# Patient Record
Sex: FEMALE | URBAN - METROPOLITAN AREA
[De-identification: names, ages, dates, MRNs, and addresses within clinical notes are randomized per-mention and may not be internally consistent; named-entity substitution may affect disease eponyms.]

---

## 2024-11-22 ENCOUNTER — OFFICE VISIT (OUTPATIENT)
Dept: NEUROLOGY | Facility: MEDICAL CENTER | Age: 74
End: 2024-11-22
Attending: STUDENT IN AN ORGANIZED HEALTH CARE EDUCATION/TRAINING PROGRAM
Payer: MEDICARE

## 2024-11-22 VITALS
DIASTOLIC BLOOD PRESSURE: 60 MMHG | HEART RATE: 83 BPM | HEIGHT: 66 IN | BODY MASS INDEX: 30.47 KG/M2 | RESPIRATION RATE: 16 BRPM | OXYGEN SATURATION: 99 % | SYSTOLIC BLOOD PRESSURE: 116 MMHG | WEIGHT: 189.6 LBS | TEMPERATURE: 97.3 F

## 2024-11-22 DIAGNOSIS — R29.898 WEAKNESS OF BOTH LOWER EXTREMITIES: ICD-10-CM

## 2024-11-22 DIAGNOSIS — M54.6 RIGHT-SIDED THORACIC BACK PAIN, UNSPECIFIED CHRONICITY: ICD-10-CM

## 2024-11-22 PROCEDURE — 3078F DIAST BP <80 MM HG: CPT | Performed by: STUDENT IN AN ORGANIZED HEALTH CARE EDUCATION/TRAINING PROGRAM

## 2024-11-22 PROCEDURE — 99202 OFFICE O/P NEW SF 15 MIN: CPT | Performed by: STUDENT IN AN ORGANIZED HEALTH CARE EDUCATION/TRAINING PROGRAM

## 2024-11-22 PROCEDURE — 99204 OFFICE O/P NEW MOD 45 MIN: CPT | Performed by: STUDENT IN AN ORGANIZED HEALTH CARE EDUCATION/TRAINING PROGRAM

## 2024-11-22 PROCEDURE — 3074F SYST BP LT 130 MM HG: CPT | Performed by: STUDENT IN AN ORGANIZED HEALTH CARE EDUCATION/TRAINING PROGRAM

## 2024-11-22 RX ORDER — LANSOPRAZOLE 30 MG/1
30 CAPSULE, DELAYED RELEASE ORAL DAILY
COMMUNITY

## 2024-11-22 RX ORDER — ESOMEPRAZOLE MAGNESIUM 20 MG/1
TABLET, DELAYED RELEASE ORAL
COMMUNITY

## 2024-11-22 RX ORDER — LISINOPRIL 10 MG/1
10 TABLET ORAL DAILY
COMMUNITY

## 2024-11-22 RX ORDER — ATORVASTATIN CALCIUM 10 MG/1
20 TABLET, FILM COATED ORAL NIGHTLY
COMMUNITY

## 2024-11-22 RX ORDER — SPIRONOLACTONE 25 MG/1
25 TABLET ORAL DAILY
COMMUNITY

## 2024-11-22 RX ORDER — FUROSEMIDE 40 MG/1
40 TABLET ORAL DAILY
COMMUNITY

## 2024-11-22 RX ORDER — LIDOCAINE 4 G/G
1 PATCH TOPICAL EVERY 24 HOURS
Qty: 30 PATCH | Refills: 0 | Status: SHIPPED | OUTPATIENT
Start: 2024-11-22 | End: 2024-12-22

## 2024-11-22 RX ORDER — TRAMADOL HYDROCHLORIDE 50 MG/1
50 TABLET ORAL EVERY 4 HOURS PRN
COMMUNITY

## 2024-11-22 ASSESSMENT — PATIENT HEALTH QUESTIONNAIRE - PHQ9
SUM OF ALL RESPONSES TO PHQ QUESTIONS 1-9: 5
CLINICAL INTERPRETATION OF PHQ2 SCORE: 2
5. POOR APPETITE OR OVEREATING: 1 - SEVERAL DAYS

## 2024-11-22 NOTE — PROGRESS NOTES
"[facility] NEUROLOGY    Date of Service: 11/22/24    Referred by: Johnnie De Oliveira  [address] [place] [address]      Reason for the referral/Chief Complaint:  Leg weakness     History of present illness (HPI)   Kori Gillis is a 74 y.o. left handed woman with history of hypertension who presents due to back pain and leg weakness.     She fell 4 years ago - presumably loss consciousness. She was told that \"her back was broke\". She went back to normal after PT and chiropractor sessions but she fell again from a lader 2 years ago because her legs gave out. She was started again on PT. She was told that she had a T11, T12 fracture. This year, she fell again because her legs gave up again. She has had back pain since then - located in right flank, she describes the pain as dull, doesn't shoot, sometimes it lasts for few minutes but sometimes it last for couple days. She has also been experiencing episodes of leg numbness - both entire legs get numb from hip down when she is sitting down    She went to [facility] who reportedly recommended surgery but she declined. She was prescribed  Gabapentin but she did not tolerate. She underwent spinal injections which did not make a difference. She feels that her legs are always weak.  She has difficulty climbing stairs, raising from a chair, imbalance. Arms are asymptomatic.  The more she walks the weaker she gets - she can walk up to half a block .     Medical history  Hypertension     Surgical history  Hysterectomy   Colon Cancer s/p surgery     Relevant family history  None     Social history  Retired     Tobacco: No  Alcohol: Drinks 3 beers at night.     Medications  Outpatient Medications Marked as Taking for the 11/22/24 encounter (Office Visit) with Tiffany English M.D.   Medication Sig Dispense Refill    furosemide (LASIX) 40 MG Tab Take 40 mg by mouth every day.      spironolactone (ALDACTONE) 25 MG Tab Take 25 mg by mouth every day.      lisinopril " (PRINIVIL) 10 MG Tab Take 10 mg by mouth every day.      atorvastatin (LIPITOR) 10 MG Tab Take 20 mg by mouth every evening. 40 mg      lansoprazole (PREVACID) 30 MG CAPSULE DELAYED RELEASE Take 30 mg by mouth every day.      multivitamin Tab Take 1 Tablet by mouth every day.      Esomeprazole Magnesium 20 MG Tablet Delayed Response Take  by mouth.      traMADol (ULTRAM) 50 MG Tab Take 50 mg by mouth every four hours as needed. 25mg as needed      lidocaine (ASPERFLEX) 4 % Patch Place 1 Patch on the skin every 24 hours for 30 days. 30 Patch 0       Physical exam    Vitals:    11/22/24 1409   BP: 116/60   Pulse: 83   Resp: 16   Temp: 36.3 °C (97.3 °F)   SpO2: 99%       General: Appears comfortable     Neurologic exam:    Mental Status: Alert & oriented to person, place, time, and situation. Memory and recall are grossly intact. Language testing shows normal naming, repetition, fluency, and comprehension.    Cranial Nerves: Pupils are equal and reactive to light. Eye movements are normal. No nystagmus noted. The funduscopic exam was deferred. Visual fields are full to confrontation. Facial sensation is symmetric to light touch. Facial activation is symmetric and full. Hearing is intact to conversation. Palate elevates symmetrically. Shoulder shrug is 5/5 power bilaterally. Tongue is midline.    Neuromuscular exam:     Tone: Normal  Fasciculations: None  Atrophy: None  Deformity: None  Rising from chair: Stands from the seated position independently without arm support    Formal muscle testing (MRC grade):    Upper extremities                                                                           Right  Left    Deltoid  5 5        Biceps  5 5   Triceps  5 5   Wrist Flexion  5 5   Wrist Extension 5 5   EDC 5 5   FDP 2,3 5 5   FDP 4,5 5 5   EPL 5 5   FPL 5 5   FDI 5 5   APB 5 5   ADM 5 5   Neck Flexion 5 5   Neck Extension 5 5     Lower extremities    Right  Left    Iliopsoas  4+ giveaway 4 +giveaway    Quadriceps 5 5    Hamstrings 4+ giveaway 4+ giveaway   Tibialis Anterior 5 5   Gastrocnemius 5 5   Foot Inversion 5 5   Foot eversion 5 5   EHL 5 5   Hip Abduction 5 5   Hip Adduction 5 5     Reflexes   Right Left   Pectoralis Present Present   Biceps +++ +++   Triceps ++ ++   Brachioradialis +++ +++   Isbell Absent Absent   Patellar ++ ++   Ankle ++ ++   Clonus Absent Absent   Plantar  Downgoing Downgoing     Sensory:  Light touch: Normal and symmetrical in 4 extremities   Vibration: Normal and symmetrical in great toes and thumbs   Joint Position: Normal in great toes  Pinprick: Normal and symmetrical in 4 extremities   Temperature: Normal and symmetrical in 4 extremities     Coordination: Finger-to-nose movements are smooth, without ataxia or dysmetria.    Gait: Gait is unstable with wide base and normal stride length. Tandem gait is challenging Toe-walking and heel-walking only few steps due to balance.     Relevant Imaging results:   Thoracic spine with and without contrast 12/2023: Subactue or older 75% compression of T11. Minimal marrow edema. No bony canal narrowing. No cord compression.     Lumbar spine MRI 01/2024: L1-L2 mild central stenosis, L2-L3 minimal central stenosis, L3-L4: Mild central stenosis, no foraminal stenosis. L4-L5: Minimal central stenosis. L5-S1: No central stenosis.     Assessment and Plan:   73 y/o patient with history of T11 compression after a fall who presents with leg weakness, imbalance, and back pain. Physical exam remarkable for increased reflexes in arms. There is giveaway weakness of both legs (hip flexion). Prior w/up with MRI thoracic and lumbar spine did not reveal significant stenosis to explain her symptoms (however I cannot see images - only per report). I believe it could be her cervical spine causing her weakness and imbalance. She also has pain on her right flank - unclear if this is coming from her spinal cord.     1. Weakness of both lower extremities  - MR-CERVICAL SPINE-W/O;  Future  - Referral to Physical Therapy    2. Right-sided thoracic back pain, unspecified chronicity  - lidocaine (ASPERFLEX) 4 % Patch; Place 1 Patch on the skin every 24 hours for 30 days.  Dispense: 30 Patch; Refill: 0    Other orders  - furosemide (LASIX) 40 MG Tab; Take 40 mg by mouth every day.  - spironolactone (ALDACTONE) 25 MG Tab; Take 25 mg by mouth every day.  - lisinopril (PRINIVIL) 10 MG Tab; Take 10 mg by mouth every day.  - atorvastatin (LIPITOR) 10 MG Tab; Take 20 mg by mouth every evening. 40 mg  - lansoprazole (PREVACID) 30 MG CAPSULE DELAYED RELEASE; Take 30 mg by mouth every day.  - multivitamin Tab; Take 1 Tablet by mouth every day.  - Esomeprazole Magnesium 20 MG Tablet Delayed Response; Take  by mouth.  - traMADol (ULTRAM) 50 MG Tab; Take 50 mg by mouth every four hours as needed. 25mg as needed       Return to the clinic in 3 months       Approximately 45 minutes were dedicated to this encounter for direct patient care, which did not include chart reviewing, placing orders, and completing documentation.         Tiffany Dalton MD  Neurology  Clinical Neurophysiology   [facility]

## 2024-12-03 ENCOUNTER — TELEPHONE (OUTPATIENT)
Dept: ENDOCRINOLOGY | Facility: MEDICAL CENTER | Age: 74
End: 2024-12-03
Payer: MEDICARE

## 2024-12-03 NOTE — TELEPHONE ENCOUNTER
Pt called because they needed a refill for their medication sent to a different pharmacy    I found out pt had called the wrong depart ment and let them know I was going to forward them to their correct one so they can get their Rx refilled

## 2024-12-04 ENCOUNTER — TELEPHONE (OUTPATIENT)
Dept: NEUROLOGY | Facility: MEDICAL CENTER | Age: 74
End: 2024-12-04
Payer: MEDICARE

## 2024-12-04 DIAGNOSIS — M54.6 RIGHT-SIDED THORACIC BACK PAIN, UNSPECIFIED CHRONICITY: ICD-10-CM

## 2024-12-04 RX ORDER — LIDOCAINE 4 G/G
1 PATCH TOPICAL EVERY 24 HOURS
Qty: 30 PATCH | Refills: 0 | Status: SHIPPED | OUTPATIENT
Start: 2024-12-04 | End: 2025-01-03

## 2024-12-04 NOTE — TELEPHONE ENCOUNTER
PT called because a person named jaoquin gave her a call she didn't make it to the phone in time and our number is the number he said to call back. Give a call back to the PT at [phone]  12/3/24

## 2024-12-04 NOTE — TELEPHONE ENCOUNTER
Spoke to patient she is asking for a new script be sent to new pharmacy [facility] . She got confused and  gave  the wrong pharmacy.    Thank you

## 2024-12-04 NOTE — TELEPHONE ENCOUNTER
Spoke to patient she forgot and gave wrong pharmacy. I changed it to [facility]. Please send new script to [facility]     Thanks,    Radha

## 2025-01-08 ENCOUNTER — HOSPITAL ENCOUNTER (OUTPATIENT)
Dept: RADIOLOGY | Facility: MEDICAL CENTER | Age: 75
End: 2025-01-08
Payer: MEDICARE

## 2025-02-18 ENCOUNTER — OFFICE VISIT (OUTPATIENT)
Dept: NEUROLOGY | Facility: MEDICAL CENTER | Age: 75
End: 2025-02-18
Attending: PSYCHIATRY & NEUROLOGY
Payer: MEDICARE

## 2025-02-18 VITALS
HEART RATE: 86 BPM | OXYGEN SATURATION: 96 % | WEIGHT: 190.7 LBS | RESPIRATION RATE: 16 BRPM | BODY MASS INDEX: 30.65 KG/M2 | SYSTOLIC BLOOD PRESSURE: 96 MMHG | DIASTOLIC BLOOD PRESSURE: 64 MMHG | TEMPERATURE: 97.4 F | HEIGHT: 66 IN

## 2025-02-18 DIAGNOSIS — Z85.038 HISTORY OF COLON CANCER: ICD-10-CM

## 2025-02-18 DIAGNOSIS — Z86.711 HISTORY OF PULMONARY ARTERY THROMBOSIS: ICD-10-CM

## 2025-02-18 DIAGNOSIS — R53.83 OTHER FATIGUE: ICD-10-CM

## 2025-02-18 DIAGNOSIS — R29.898 WEAKNESS OF BOTH LOWER EXTREMITIES: ICD-10-CM

## 2025-02-18 DIAGNOSIS — D50.8 OTHER IRON DEFICIENCY ANEMIA: ICD-10-CM

## 2025-02-18 PROCEDURE — 99215 OFFICE O/P EST HI 40 MIN: CPT | Performed by: PSYCHIATRY & NEUROLOGY

## 2025-02-18 PROCEDURE — 3078F DIAST BP <80 MM HG: CPT | Performed by: PSYCHIATRY & NEUROLOGY

## 2025-02-18 PROCEDURE — G2212 PROLONG OUTPT/OFFICE VIS: HCPCS | Performed by: PSYCHIATRY & NEUROLOGY

## 2025-02-18 PROCEDURE — 3074F SYST BP LT 130 MM HG: CPT | Performed by: PSYCHIATRY & NEUROLOGY

## 2025-02-18 PROCEDURE — 99212 OFFICE O/P EST SF 10 MIN: CPT | Performed by: PSYCHIATRY & NEUROLOGY

## 2025-02-18 RX ORDER — LANOLIN ALCOHOL/MO/W.PET/CERES
1 CREAM (GRAM) TOPICAL
COMMUNITY

## 2025-02-18 RX ORDER — BISACODYL 5 MG/1
5 TABLET, DELAYED RELEASE ORAL
COMMUNITY

## 2025-02-18 RX ORDER — DM/P-EPHED/ACETAMINOPH/DOXYLAM
5000 LIQUID (ML) ORAL DAILY
COMMUNITY

## 2025-02-18 RX ORDER — ACETAMINOPHEN 325 MG/1
325 TABLET ORAL
COMMUNITY

## 2025-02-18 ASSESSMENT — PATIENT HEALTH QUESTIONNAIRE - PHQ9
5. POOR APPETITE OR OVEREATING: 2 - MORE THAN HALF THE DAYS
SUM OF ALL RESPONSES TO PHQ QUESTIONS 1-9: 9
CLINICAL INTERPRETATION OF PHQ2 SCORE: 2

## 2025-02-18 NOTE — PROGRESS NOTES
"[facility] NEUROLOGY CLINIC    Date of Service: 02/18/25    Referred by: Johnnie De Oliveira  [address] [place] [id number]      Reason for referral  Follow up from Dr. Dalton's office.    History of present illness (HPI)   Kori Gillis is a 74 y.o. female with a history of colon cancer s/p resection x2 and chemotherapy with FOLFOX (folinic acid, 5-FU, and oxaliplatin) 8 cycles in 2014 and T11 compression fracture after a fall, who saw Dr. Dalton on 11/22/24 for bilateral leg weakness, back pain, and brisk reflexes of the upper extremities. Prior thoracic and lumbar MRI reported as normal.  She does have intermittent numbness on the right leg, which is also pretty weak and painful over the right thigh and groin area.  Her left leg has improved a lot with PT. She denies any urinary/fecal incontinence. She feels like she needs to use a cane. She says that coming to this appointment, her \"legs felt like spaghetti and she was shaking like a leaf\". Denies any swelling over the painful area of the right leg.   She has a history of iron deficiency anemia and PE.   She has been on ferrous sulfate for years, as well as Lipitor prescribed by her cardiologist.     Review of Systems (ROS)   Short of breath and fatigue. Pain and numbness over the right leg.    Oncology history:  1/27/2012 - colonoscopy- circumferential mass 80 cm from the anal verge in the transverse colon, adenocarcinoma.    2/09/2012 - transverse colectomy with mobilization of splenic flexure, T3 N0 M0, 0 of 21 nodes positive. No angiolymphatic invasion. Margins negative.    July 2012 - stopped f/u with Dr Corrigan d/t no insurance, so stopped followup.    3/31/2014 - colonoscopy - lesion in the transverse colon. Pathology showed invasive poorly differentiated adenocarcinoma of the colon.    4/03/2014 - [id number] transverse colon resection, small bowel resection, both with anastomosis, adherent to the small bowel .  cancer was at least transected during  " Resection, angiolymphatic invasion.  High risk of recurrence.  Readmitted with malignant hypertension, electrolyte abnormalities, anemia as well as pulmonary embolism and pneumonia. She was treated with antibiotics, anticoagulation, blood transfusion, electrolyte supplementation.  Referred to I.  4/21/2014 - CT chest - small right upper lobe pulmonary embolus. no metastatic disease noted. CT scan of the abdomen and pelvis with IV contrast was done on 03/28/2014, showed early small bowel obstruction versus partial small-bowel obstruction with transition point at the ileum. CEA was elevated prior to surgery at 20.2.    7/11 - 10/21/2014 - m FOLFOX6 adjuvant.  8 cycles.  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  Surveillance f/u.    5/22/2015 Colonoscopy - normal and was recommended to be repeated in 3 years.   2/2016- scans showing WILL.   2/2017 - CT CAP - WILL.   2/2018 - CT CAP - WILL.   6/11/18 - Colonoscopy - no polyps.  Repeat 5 years  3/6/19 - CT CAP - WILL     She has not followed with oncology since then. She does not have a primary care provider.  An MRI of the cervical spine ordered by Dr. Dalton showed mild degenerative changes, but no cord compression or significant canal stenosis.     Medical history  Hypertension    Surgical history  Hysterectomy  Colon cancer s/p surgery and chemotherapy    Relevant family history  None      Social history  Social History     Tobacco Use    Smoking status: Former     Types: Cigarettes    Smokeless tobacco: Former   Substance Use Topics    Alcohol use: Yes     Alcohol/week: 1.8 oz     Types: 3 Cans of beer per week     Comment: 3 beers every night         Medications    Outpatient Medications Marked as Taking for the 2/18/25 encounter (Office Visit) with Prashanth Neal M.D.   Medication Sig Dispense Refill    Cyanocobalamin (VITAMIN B-12) 1000 MCG Tab Take 1 Tablet by mouth every day.      cholecalciferol (D 5000) 5000 UNIT Cap Take 5,000 Units by mouth every day.       acetaminophen (TYLENOL) 325 MG Tab Take 325 mg by mouth.      Ferrous Sulfate (IRON PO)       Naloxone (NARCAN) 4 MG/0.1ML Liquid Administer 4 mg into affected nostril(S).      bisacodyl (DULCOLAX) 5 MG EC tablet Take 5 mg by mouth.      spironolactone (ALDACTONE) 25 MG Tab Take 25 mg by mouth every day.      lisinopril (PRINIVIL) 10 MG Tab Take 10 mg by mouth every day.      atorvastatin (LIPITOR) 10 MG Tab Take 20 mg by mouth every evening. 40 mg      lansoprazole (PREVACID) 30 MG CAPSULE DELAYED RELEASE Take 30 mg by mouth every day.      multivitamin Tab Take 1 Tablet by mouth every day.      Esomeprazole Magnesium 20 MG Tablet Delayed Response Take  by mouth.      traMADol (ULTRAM) 50 MG Tab Take 50 mg by mouth every four hours as needed. 25mg as needed       Not taking Magnesium      Screening    Depression Screening    Little interest or pleasure in doing things?  1 - several days  Feeling down, depressed , or hopeless? 1 - several days  Trouble falling or staying asleep, or sleeping too much?  1 - several days  Feeling tired or having little energy?  2 - more than half the days  Poor appetite or overeating?  2 - more than half the days  Feeling bad about yourself - or that you are a failure or have let yourself or your family down? 1 - several days  Trouble concentrating on things, such as reading the newspaper or watching television? 0 - not at all  Moving or speaking so slowly that other people could have noticed.  Or the opposite - being so fidgety or restless that you have been moving around a lot more than usual?  0 - not at all  Thoughts that you would be better off dead, or of hurting yourself?  1 - several days  Patient Health Questionnaire Score: 9      If depressive symptoms identified deferred to follow up visit unless specifically addressed in assesment and plan.    Interpretation of PHQ-9 Total Score   Score Severity   1-4 No Depression   5-9 Mild Depression   10-14 Moderate Depression   15-19  Moderately Severe Depression   20-27 Severe Depression        [place] Suicide Severity Rating Scale     Wish to be Dead?: No  Suicidal Thoughts: No    Suicidal Thoughts with Method Without Specific Plan or Intent to Act:    Suicidal Intent Without Specific Plan:    Suicide Intent with Specific Plan:    Suicide Behavior Question: No  How long ago did you do any of these?:    C-SSRS Risk Level: No Risk    Additional Suicide Screening Questions    Suspected or Confirmed Suicide Attempted?: No  Harming or killing others?: No    [place] Suicide Reassessment     New or continued thoughts about killing self?: No  Preparing to end life?: No         Physical exam    Vitals:    02/18/25 1356   BP: 96/64   Pulse: 86   Resp: 16   Temp: 36.3 °C (97.4 °F)   SpO2: 96%       Focused Neurological Exam  Alert and oriented.   No cranial neuropathies appreciated.   No weakness of the upper extremities.   No ataxia or dysmetria.  Reflexes are 3+ patellar bilaterally and 2+ elsewhere. Absent Isbell.   There is weakness on right hip flexion (3/5), knee extension (3/5), foot dorsiflexion (3/5), and plantar flexion (4/5). No weakness on adduction or abduction.  There is impaired sensation to vibration and temperature on the right leg up to her knee.   She can stand up and walks without assistance. Romberg is negative.      Lab Results  Lab Results   Component Value Date/Time    WBC 8.1 05/02/2023 02:26 PM    RBC 3.91 05/02/2023 02:26 PM    HEMOGLOBIN 12.5 05/02/2023 02:26 PM    HEMATOCRIT 36.9 05/02/2023 02:26 PM    MCV 94 05/02/2023 02:26 PM    MCH 32 05/02/2023 02:26 PM    MCHC 33.9 05/02/2023 02:26 PM    MPV 9.9 09/21/2021 10:58 AM    NEUTSPOLYS 73 05/02/2023 02:26 PM    LYMPHOCYTES 16 05/02/2023 02:26 PM    MONOCYTES 9 05/02/2023 02:26 PM    EOSINOPHILS 1 05/02/2023 02:26 PM    BASOPHILS 1 05/02/2023 02:26 PM          Lab Results   Component Value Date/Time    SODIUM 132 (L) 09/21/2021 10:58 AM    POTASSIUM 4.5 09/21/2021 10:58 AM     CHLORIDE 97 (L) 09/21/2021 10:58 AM    CO2 25 09/21/2021 10:58 AM    GLUCOSE 97 09/21/2021 10:58 AM    BUN 26 (H) 09/21/2021 10:58 AM    CREATININE 1.17 (H) 09/21/2021 10:58 AM            NEURO-DIAGNOSTICS              MRI thoracic spine: 12/28/23        Impression and Plan  Briefly, 74 y.o. female with a history of colon cancer s/p colectomy x2 followed by FOLFOX 8 cycles in 2014, complicated by neuropathy and PE. She also has a history of falls and a T11 compression fracture and back pain. She is working with PT and noticed mild improvement with the left leg, but the right leg continues to be weak and she has pain over her groin area. MRI of the spine did not show any signs of cord compression, canal stenosis or metastatic disease, although this was done without the use of contrast.       1. Weakness of both lower extremities  If a repeat MRI of the spine is considered in the future, I would recommend the use of gadolinium contrast given her history of colorectal cancer with high risk of recurrence.   She did not receive any radiation treatments, so radiation induced plexopathy is not in the differential. However, perineural spread of colorectal cancer has been documented and needs to be ruled out. I will also screen for myopathy, as she has been on Lipitor for many years. In addition, iron overload from ferrous sulfate can cause muscle inflammation from oxidative stress, so I will check a Ferritin level.   In addition, exposure to platinum drugs from chemotherapy could explain the sensorimotor neuropathy that she has. An EMG/NCS can be considered later on.     - CREATINE KINASE; Future  - MR-LUMBO-SACRAL PLEXUS W & WO (COMBO); Future  - Ferritin level    2. Other fatigue  She had a history of anemia and cancer. Repeat labs are needed.  - COMP METABOLIC PANEL  - CBC WITH DIFFERENTIAL; Future  - FERRITIN; Future  - LIPID PANEL W/ CHOL/HDL RATIO    3. History of pulmonary artery thrombosis  She was very short of  breath today, and her blood pressure was low.  - CT-CHEST (THORAX) WITH; Future    4. History of colon cancer  Supposedly in remission, although she has not seen an oncologist in years. She also needs to establish care with a PCP. I recommend that all her appointments are scheduled as Tele-medicine, as she leaves 4 hours away from here and she does not have any family or close friends that can drive her here.    - CBC WITH DIFFERENTIAL; Future  - Referral to establish with PCP  - CEA; Future    5. Other iron deficiency anemia  - FERRITIN; Future      Numerous questions were answered to the best of my knowledge. The patient is in agreement with the plan. Return to the clinic (telemedicine) in 4-6 weeks with MRI.     ADMINISTRATIVE BILLING  I spent a total of 60 minutes on this patient encounter.        Prashanth Neal MD  Diplomate of the American Board of Psychiatry and Neurology.  General Neurology & Neuro-Oncology.  [facility].   of Clinical Neurology at [facility].

## 2025-03-05 ENCOUNTER — TELEPHONE (OUTPATIENT)
Dept: NEUROLOGY | Facility: MEDICAL CENTER | Age: 75
End: 2025-03-05
Payer: MEDICARE

## 2025-03-05 NOTE — TELEPHONE ENCOUNTER
Pt called she has broken her laptop so she can't send or get messages hill she get's it fixed   3/5/25  1:24 PM

## 2025-04-29 ENCOUNTER — TELEMEDICINE (OUTPATIENT)
Dept: NEUROLOGY | Facility: MEDICAL CENTER | Age: 75
End: 2025-04-29
Attending: PSYCHIATRY & NEUROLOGY
Payer: MEDICARE

## 2025-04-29 ENCOUNTER — HOSPITAL ENCOUNTER (OUTPATIENT)
Dept: RADIOLOGY | Facility: MEDICAL CENTER | Age: 75
End: 2025-04-29

## 2025-04-29 DIAGNOSIS — R29.898 WEAKNESS OF BOTH LOWER EXTREMITIES: ICD-10-CM

## 2025-04-29 ASSESSMENT — PATIENT HEALTH QUESTIONNAIRE - PHQ9: CLINICAL INTERPRETATION OF PHQ2 SCORE: 0

## 2025-04-29 NOTE — PROGRESS NOTES
[facility] NEUROLOGY TELEMEDICINE CLINIC    Date of service: 04/29/25    Last office encounter: 2/18/25    Location: Home, by herself.    Chief complain  Bilateral leg weakness.       Follow up history  Goes to therapy twice a week and feels like she  Not dropping things with her hands, but needs help opening jars.   No falls since I saw her. She has chronic back pain, right above the hip on the right side. The pain is not constant anymore, and now she can bend over easily. No b/b issues.     Review of Systems (ROS)  Negative for: Fever, chills, chest pain, shortness of breath, cough, diarrhea, constipation, urinary frequency, dysuria, skin rash, swelling.      Medications  Outpatient Medications Marked as Taking for the 4/29/25 encounter (Telemedicine) with Prashanth Neal M.D.   Medication Sig Dispense Refill    cholecalciferol (D 5000) 5000 UNIT Cap Take 5,000 Units by mouth every day.      acetaminophen (TYLENOL) 325 MG Tab Take 325 mg by mouth.      Naloxone (NARCAN) 4 MG/0.1ML Liquid Administer 4 mg into affected nostril(S).      spironolactone (ALDACTONE) 25 MG Tab Take 25 mg by mouth every day.      lisinopril (PRINIVIL) 10 MG Tab Take 10 mg by mouth every day.      atorvastatin (LIPITOR) 10 MG Tab Take 20 mg by mouth every evening. 40 mg      multivitamin Tab Take 1 Tablet by mouth every day.      Esomeprazole Magnesium 20 MG Tablet Delayed Response Take  by mouth.             SCREENING    Depression Screening    Little interest or pleasure in doing things?  0 - not at all  Feeling down, depressed , or hopeless? 0 - not at all  Trouble falling or staying asleep, or sleeping too much?     Feeling tired or having little energy?     Poor appetite or overeating?     Feeling bad about yourself - or that you are a failure or have let yourself or your family down?    Trouble concentrating on things, such as reading the newspaper or watching television?    Moving or speaking so slowly that other people could have  noticed.  Or the opposite - being so fidgety or restless that you have been moving around a lot more than usual?     Thoughts that you would be better off dead, or of hurting yourself?     Patient Health Questionnaire Score:        If depressive symptoms identified deferred to follow up visit unless specifically addressed in assesment and plan.    Interpretation of PHQ-9 Total Score   Score Severity   1-4 No Depression   5-9 Mild Depression   10-14 Moderate Depression   15-19 Moderately Severe Depression   20-27 Severe Depression          [place] Suicide Severity Rating Scale     Wish to be Dead?: No  Suicidal Thoughts: No    Suicidal Thoughts with Method Without Specific Plan or Intent to Act:    Suicidal Intent Without Specific Plan:    Suicide Intent with Specific Plan:    Suicide Behavior Question: No  How long ago did you do any of these?:    C-SSRS Risk Level: No Risk    Additional Suicide Screening Questions    Suspected or Confirmed Suicide Attempted?:    Harming or killing others?:      [place] Suicide Reassessment     New or continued thoughts about killing self?:    Preparing to end life?:          Physical exam    There were no vitals filed for this visit.    Limited exam.  Alert and oriented, normal speech, no cranial neuropathies.           Laboratory results  Lab Results   Component Value Date/Time    WBC 6.7 02/28/2025 06:49 AM    RBC 3.89 02/28/2025 06:49 AM    HEMOGLOBIN 12.5 02/28/2025 06:49 AM    HEMATOCRIT 38.7 02/28/2025 06:49 AM     (H) 02/28/2025 06:49 AM    MCH 32.1 02/28/2025 06:49 AM    MCHC 32.3 02/28/2025 06:49 AM    MPV 9.9 09/21/2021 10:58 AM    NEUTSPOLYS 65 02/28/2025 06:49 AM    LYMPHOCYTES 28 02/28/2025 06:49 AM    MONOCYTES 7 02/28/2025 06:49 AM    EOSINOPHILS 0 02/28/2025 06:49 AM    BASOPHILS 0 02/28/2025 06:49 AM        Lab Results   Component Value Date/Time    SODIUM 132 (L) 02/28/2025 06:49 AM    POTASSIUM 4.6 02/28/2025 06:49 AM    CHLORIDE 100 02/28/2025 06:49 AM     CO2 17 (L) 02/28/2025 06:49 AM    GLUCOSE 78 02/28/2025 06:49 AM    BUN 18 02/28/2025 06:49 AM    CREATININE 1.02 (H) 02/28/2025 06:49 AM    BUNCREATRAT 18 02/28/2025 06:49 AM          NEURO-DIAGNOSTICS  MRI lumbar spine: 3/4/25        MRI Cervical spine: 1/14/25      Impression and Plan  Briefly, 74 y.o. female with chronic bilateral lower extremity weakness. Imaging revealed an acute compression fracture of the L3 endplate, as well as a L3-L4 disc protrussion contacting the L4 nerve root.   The laboratory work up came back unremarkable.   I offered her an EMG/NCS, but since she is doing great progress with PT, she would like to hold on this. She will let me know if things start to head in the wrong direction, but otherwise I plan to see her in 4 months from now.        Numerous questions were answered to the best of my knowledge. The patient is in agreement with the plan.   Return to the clinic in 4 months.       ADMINISTRATIVE BILLING  I spent a total of 28 minutes on this patient encounter.      Prashanth Neal MD  Diplomate of the [employer] Board of Psychiatry and Neurology.  General Neurology & Neuro-Oncology.  [facility].   of Clinical Neurology at [facility].